# Patient Record
Sex: FEMALE | Race: BLACK OR AFRICAN AMERICAN
[De-identification: names, ages, dates, MRNs, and addresses within clinical notes are randomized per-mention and may not be internally consistent; named-entity substitution may affect disease eponyms.]

---

## 2018-01-27 ENCOUNTER — HOSPITAL ENCOUNTER (EMERGENCY)
Dept: HOSPITAL 87 - ER | Age: 28
Discharge: HOME | End: 2018-01-27
Payer: MEDICAID

## 2018-01-27 VITALS — BODY MASS INDEX: 33.06 KG/M2 | WEIGHT: 198.42 LBS | HEIGHT: 65 IN

## 2018-01-27 VITALS — SYSTOLIC BLOOD PRESSURE: 104 MMHG | DIASTOLIC BLOOD PRESSURE: 63 MMHG

## 2018-01-27 DIAGNOSIS — M79.632: ICD-10-CM

## 2018-01-27 DIAGNOSIS — M25.562: ICD-10-CM

## 2018-01-27 DIAGNOSIS — S06.9X9A: Primary | ICD-10-CM

## 2018-01-27 DIAGNOSIS — R51: ICD-10-CM
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
## 2018-01-27 LAB
APTT PPP: 29.4 SEC (ref 23.4–31)
BASOPHILS NFR BLD AUTO: 0.9 % (ref 0–2)
CHLORIDE SERPL-SCNC: 105 MEQ/L (ref 98–107)
CO2 SERPL-SCNC: 32 MEQ/L (ref 21–32)
EOSINOPHIL NFR BLD AUTO: 0.8 % (ref 0–5)
ERYTHROCYTE [DISTWIDTH] IN BLOOD BY AUTOMATED COUNT: 13.5 % (ref 11.6–14.6)
HCG SERPL QL: NEGATIVE
HCT VFR BLD AUTO: 38.7 % (ref 36–48)
HGB BLD-MCNC: 13.3 G/DL (ref 12–16)
INR PPP: 1
LYMPHOCYTES NFR BLD AUTO: 25.1 % (ref 20–50)
MCH RBC QN AUTO: 29.3 PG (ref 28–32)
MCV RBC AUTO: 85.6 FL (ref 81–99)
MONOCYTES NFR BLD AUTO: 7.6 % (ref 2–8)
NEUTROPHILS NFR BLD AUTO: 65.6 % (ref 40–76)
PLATELET # BLD AUTO: 294 X1000/UL (ref 130–400)
PMV BLD AUTO: 8.7 FL (ref 7.4–10.4)
PROTHROMBIN TIME: 10.7 SEC (ref 9.4–11.6)
RBC # BLD AUTO: 4.52 MILL/UL (ref 4.2–5.4)

## 2018-01-27 PROCEDURE — 73060 X-RAY EXAM OF HUMERUS: CPT

## 2018-01-27 PROCEDURE — 70450 CT HEAD/BRAIN W/O DYE: CPT

## 2018-01-27 PROCEDURE — 72125 CT NECK SPINE W/O DYE: CPT

## 2018-01-27 PROCEDURE — 84703 CHORIONIC GONADOTROPIN ASSAY: CPT

## 2018-01-27 PROCEDURE — 36415 COLL VENOUS BLD VENIPUNCTURE: CPT

## 2018-01-27 PROCEDURE — 96375 TX/PRO/DX INJ NEW DRUG ADDON: CPT

## 2018-01-27 PROCEDURE — 73090 X-RAY EXAM OF FOREARM: CPT

## 2018-01-27 PROCEDURE — 72100 X-RAY EXAM L-S SPINE 2/3 VWS: CPT

## 2018-01-27 PROCEDURE — 73070 X-RAY EXAM OF ELBOW: CPT

## 2018-01-27 PROCEDURE — 73560 X-RAY EXAM OF KNEE 1 OR 2: CPT

## 2018-01-27 PROCEDURE — 85610 PROTHROMBIN TIME: CPT

## 2018-01-27 PROCEDURE — 80053 COMPREHEN METABOLIC PANEL: CPT

## 2018-01-27 PROCEDURE — 85025 COMPLETE CBC W/AUTO DIFF WBC: CPT

## 2018-01-27 PROCEDURE — 96374 THER/PROPH/DIAG INJ IV PUSH: CPT

## 2018-01-27 PROCEDURE — 73590 X-RAY EXAM OF LOWER LEG: CPT

## 2018-01-27 PROCEDURE — 85730 THROMBOPLASTIN TIME PARTIAL: CPT

## 2018-01-27 PROCEDURE — 99285 EMERGENCY DEPT VISIT HI MDM: CPT

## 2018-01-27 PROCEDURE — 73502 X-RAY EXAM HIP UNI 2-3 VIEWS: CPT

## 2018-01-27 PROCEDURE — 72070 X-RAY EXAM THORAC SPINE 2VWS: CPT

## 2018-01-27 PROCEDURE — 73552 X-RAY EXAM OF FEMUR 2/>: CPT

## 2019-09-04 ENCOUNTER — HOSPITAL ENCOUNTER (EMERGENCY)
Dept: HOSPITAL 5 - ED | Age: 29
Discharge: HOME | End: 2019-09-04
Payer: COMMERCIAL

## 2019-09-04 VITALS — SYSTOLIC BLOOD PRESSURE: 104 MMHG | DIASTOLIC BLOOD PRESSURE: 73 MMHG

## 2019-09-04 DIAGNOSIS — J01.10: Primary | ICD-10-CM

## 2019-09-04 DIAGNOSIS — Z79.899: ICD-10-CM

## 2019-09-04 LAB
BACTERIA #/AREA URNS HPF: (no result) /HPF
BILIRUB UR QL STRIP: (no result)
BLOOD UR QL VISUAL: (no result)
MUCOUS THREADS #/AREA URNS HPF: (no result) /HPF
PH UR STRIP: 7 [PH] (ref 5–7)
PROT UR STRIP-MCNC: (no result) MG/DL
RBC #/AREA URNS HPF: 1 /HPF (ref 0–6)
UROBILINOGEN UR-MCNC: < 2 MG/DL (ref ?–2)
WBC #/AREA URNS HPF: 3 /HPF (ref 0–6)

## 2019-09-04 PROCEDURE — 87430 STREP A AG IA: CPT

## 2019-09-04 PROCEDURE — 81001 URINALYSIS AUTO W/SCOPE: CPT

## 2019-09-04 PROCEDURE — 96372 THER/PROPH/DIAG INJ SC/IM: CPT

## 2019-09-04 PROCEDURE — 99283 EMERGENCY DEPT VISIT LOW MDM: CPT

## 2019-09-04 PROCEDURE — 87400 INFLUENZA A/B EACH AG IA: CPT

## 2019-09-04 PROCEDURE — 87116 MYCOBACTERIA CULTURE: CPT

## 2019-09-04 PROCEDURE — 81025 URINE PREGNANCY TEST: CPT

## 2019-09-04 NOTE — EMERGENCY DEPARTMENT REPORT
ED General Adult HPI





- General


Chief complaint: Headache


Stated complaint: BODY PAIN


Time Seen by Provider: 09/04/19 19:18


Source: patient


Mode of arrival: Ambulatory


Limitations: No Limitations





- History of Present Illness


Initial comments: 





Patient is a 29-year-old  female with no past medical history 

presents to the ED with complaint of acute onset persistent nasal and sinus 

congestion, frontal sinus pressure and headache, diffuse body aches and pains 

and mild cough for the last 2 days.  Patient states that 10 her colleagues at 

work was diagnosed with flu and had similar symptoms.  Patient denies dizziness,

nausea, vomiting, fever, chills, abdominal pain, sore throat, back pain, neck 

pain, change in vision, dysuria, urinary frequency and urgency, diarrhea, 

lightheadedness or syncope.


MD Complaint: Headache, diffuse body aches and pains


-: Sudden, days(s) (2)


Location: head, face, back


Radiation: non-radiation


Severity scale (0 -10): 8


Quality: aching, sharp


Consistency: constant


Improves with: none


Worsens with: none


Associated Symptoms: denies other symptoms, headaches, loss of appetite, 

malaise.  denies: confusion, cough, diaphoresis, fever/chills, nausea/vomiting, 

rash, seizure, shortness of breath, weakness, other


Treatments Prior to Arrival: none





- Related Data


                                  Previous Rx's











 Medication  Instructions  Recorded  Last Taken  Type


 


Azithromycin [Zithromax Z-HU] 250 mg PO DAILY #6 tablet 09/04/19 Unknown Rx


 


Butalb/Acetamin/Caff -40 1 - 2 tab PO Q6HR PRN #12 tab 09/04/19 Unknown Rx





[Fioricet -40]    


 


Cetirizine HCl [Zyrtec 10mg tab] 10 mg PO DAILY #30 tablet 09/04/19 Unknown Rx


 


Ibuprofen [Motrin] 800 mg PO Q8HR PRN #20 tablet 09/04/19 Unknown Rx











                                    Allergies











Allergy/AdvReac Type Severity Reaction Status Date / Time


 


No Known Allergies Allergy   Unverified 09/04/19 18:42














ED Review of Systems


ROS: 


Stated complaint: BODY PAIN


Other details as noted in HPI





Constitutional: denies: chills, fever


Eyes: denies: eye pain, eye discharge, vision change


ENT: congestion, other (Frontal sinus pressure and headache).  denies: ear pain,

 throat pain


Respiratory: denies: cough, shortness of breath, wheezing


Cardiovascular: denies: chest pain, palpitations


Endocrine: no symptoms reported


Gastrointestinal: denies: abdominal pain, nausea, diarrhea


Genitourinary: denies: urgency, dysuria, discharge


Musculoskeletal: arthralgia, myalgia.  denies: back pain, joint swelling


Skin: denies: rash, lesions


Neurological: headache.  denies: weakness, paresthesias


Psychiatric: denies: anxiety, depression


Hematological/Lymphatic: denies: easy bleeding, easy bruising





ED Past Medical Hx





- Past Medical History


Previous Medical History?: No





- Surgical History


Past Surgical History?: No





- Social History


Smoking Status: Never Smoker


Substance Use Type: None





- Medications


Home Medications: 


                                Home Medications











 Medication  Instructions  Recorded  Confirmed  Last Taken  Type


 


Azithromycin [Zithromax Z-HU] 250 mg PO DAILY #6 tablet 09/04/19  Unknown Rx


 


Butalb/Acetamin/Caff -40 1 - 2 tab PO Q6HR PRN #12 tab 09/04/19  Unknown 

Rx





[Fioricet -40]     


 


Cetirizine HCl [Zyrtec 10mg tab] 10 mg PO DAILY #30 tablet 09/04/19  Unknown Rx


 


Ibuprofen [Motrin] 800 mg PO Q8HR PRN #20 tablet 09/04/19  Unknown Rx














ED Physical Exam





- General


Limitations: No Limitations


General appearance: alert, in no apparent distress





- Head


Head exam: Present: atraumatic, normocephalic, normal inspection





- Eye


Eye exam: Present: normal appearance, PERRL, EOMI


Pupils: Present: normal accommodation





- ENT


ENT exam: Present: normal exam, normal orophraynx, mucous membranes moist, TM's 

normal bilaterally, normal external ear exam, other (Palpable frontal sinus 

tenderness)





- Neck


Neck exam: Present: normal inspection, full ROM.  Absent: tenderness, 

lymphadenopathy





- Respiratory


Respiratory exam: Present: normal lung sounds bilaterally.  Absent: respiratory 

distress, wheezes, rales, stridor, chest wall tenderness, accessory muscle use, 

decreased breath sounds





- Cardiovascular


Cardiovascular Exam: Present: regular rate, normal rhythm, normal heart sounds. 

 Absent: systolic murmur, diastolic murmur, rubs, gallop





- GI/Abdominal


GI/Abdominal exam: Present: soft, normal bowel sounds.  Absent: tenderness, 

hyperactive bowel sounds, hypoactive bowel sounds, organomegaly





- Rectal


Rectal exam: Present: deferred





- Extremities Exam


Extremities exam: Present: normal inspection, full ROM, normal capillary refill





- Back Exam


Back exam: Present: normal inspection, full ROM.  Absent: tenderness, CVA 

tenderness (R), CVA tenderness (L), muscle spasm, paraspinal tenderness, 

vertebral tenderness





- Neurological Exam


Neurological exam: Present: alert, oriented X3, CN II-XII intact, normal gait, 

reflexes normal





- Psychiatric


Psychiatric exam: Present: normal affect, normal mood





- Skin


Skin exam: Present: warm, dry, intact, normal color.  Absent: rash





ED Course





                                   Vital Signs











  09/04/19 09/04/19





  19:18 20:44


 


Temperature 98.3 F 


 


Pulse Rate 70 


 


Respiratory 18 17





Rate  


 


Blood Pressure 104/73 


 


O2 Sat by Pulse 95 





Oximetry  














- Reevaluation(s)


Reevaluation #1: 





09/04/19 20:58


This is a 29-year-old female who presented to the ED with nasal and sinus 

congestion, frontal sinus pressure and diffuse body aches for 2 days.  In the 

ED, patient is alert and oriented 3, with normal vital signs.  Urinalysis is 

unremarkable.  Rapid strep and rapid influenza tests





ED Medical Decision Making





- Differential Diagnosis


acute frontal sinusitis; sinus headache, Flu like symptoms


Critical care attestation.: 


If time is entered above; I have spent that time in minutes in the direct care 

of this critically ill patient, excluding procedure time.








ED Disposition


Clinical Impression: 


 Flu-like symptoms, Sinus headache





Acute frontal sinusitis


Qualifiers:


 Recurrence: non-recurrent Qualified Code(s): J01.10 - Acute frontal sinusitis, 

unspecified





Disposition: DC-01 TO HOME OR SELFCARE


Is pt being admited?: No


Does the pt Need Aspirin: No


Condition: Stable


Instructions:  Acute Bacterial Rhinosinusitis (ED), Acute Headache (ED)


Additional Instructions: 


Take medications with food, drink plenty of fluids and follow-up with your 

primary care physician in 5-7 days for reevaluation.  Return to the ED 

immediately if symptoms get worse.


Prescriptions: 


Butalb/Acetamin/Caff -40 [Fioricet -40] 1 - 2 tab PO Q6HR PRN #12 

tab


 PRN Reason: Headache


Ibuprofen [Motrin] 800 mg PO Q8HR PRN #20 tablet


 PRN Reason: Pain , Severe (7-10)


Azithromycin [Zithromax Z-HU] 250 mg PO DAILY #6 tablet


Cetirizine HCl [Zyrtec 10mg tab] 10 mg PO DAILY #30 tablet


Referrals: 


PRIMARY CARE,MD [Primary Care Provider] - 3-5 Days


Forms:  Work/School Release Form(ED)


Time of Disposition: 20:46


Print Language: ENGLISH

## 2019-09-04 NOTE — EMERGENCY DEPARTMENT REPORT
Blank Doc





- Documentation


Documentation: 





This is a 29-year-old female that presents with headaches .  Denies any head t

rauma.  Denies worst headache or thunderclap headache.





This initial assessment/diagnostic orders/clinical plan/treatment(s) is/are 

subject to change based on patient's health status, clinical progression and re-

assessment by fellow clinical providers in the ED.  Further treatment and workup

at subsequent clinical providers discretion.  Patient/guardians urged not to 

elope from the ED as their condition may be serious if not clinically assessed 

and managed.  Initial orders include:


1- Patient sent to [facility] for further evaluation and treatment